# Patient Record
Sex: MALE | Race: BLACK OR AFRICAN AMERICAN | URBAN - METROPOLITAN AREA
[De-identification: names, ages, dates, MRNs, and addresses within clinical notes are randomized per-mention and may not be internally consistent; named-entity substitution may affect disease eponyms.]

---

## 2020-01-16 ENCOUNTER — ED HISTORICAL/CONVERTED ENCOUNTER (OUTPATIENT)
Dept: OTHER | Age: 25
End: 2020-01-16

## 2020-04-21 ENCOUNTER — ED HISTORICAL/CONVERTED ENCOUNTER (OUTPATIENT)
Dept: OTHER | Age: 25
End: 2020-04-21

## 2023-04-15 ENCOUNTER — HOSPITAL ENCOUNTER (EMERGENCY)
Age: 28
Discharge: HOME | End: 2023-04-15
Payer: MEDICAID

## 2023-04-15 ENCOUNTER — APPOINTMENT (OUTPATIENT)
Dept: ULTRASOUND IMAGING | Age: 28
End: 2023-04-15
Attending: NURSE PRACTITIONER
Payer: MEDICAID

## 2023-04-15 VITALS
TEMPERATURE: 99.5 F | OXYGEN SATURATION: 97 % | DIASTOLIC BLOOD PRESSURE: 72 MMHG | RESPIRATION RATE: 18 BRPM | WEIGHT: 130 LBS | HEART RATE: 92 BPM | HEIGHT: 71 IN | SYSTOLIC BLOOD PRESSURE: 118 MMHG | BODY MASS INDEX: 18.2 KG/M2

## 2023-04-15 DIAGNOSIS — N45.3 EPIDIDYMO-ORCHITIS, ACUTE: Primary | ICD-10-CM

## 2023-04-15 LAB
APPEARANCE UR: CLEAR
BACTERIA URNS QL MICRO: NEGATIVE /HPF
BILIRUB UR QL: NEGATIVE
COLOR UR: ABNORMAL
EPITH CASTS URNS QL MICRO: ABNORMAL /LPF
GLUCOSE UR STRIP.AUTO-MCNC: NEGATIVE MG/DL
HGB UR QL STRIP: ABNORMAL
KETONES UR QL STRIP.AUTO: NEGATIVE MG/DL
LEUKOCYTE ESTERASE UR QL STRIP.AUTO: ABNORMAL
MUCOUS THREADS URNS QL MICRO: ABNORMAL /LPF
NITRITE UR QL STRIP.AUTO: NEGATIVE
PH UR STRIP: 6 (ref 5–8)
PROT UR STRIP-MCNC: NEGATIVE MG/DL
RBC #/AREA URNS HPF: ABNORMAL /HPF (ref 0–5)
SP GR UR REFRACTOMETRY: 1.02 (ref 1–1.03)
UA: UC IF INDICATED,UAUC: ABNORMAL
UROBILINOGEN UR QL STRIP.AUTO: 4 EU/DL (ref 0.1–1)
WBC URNS QL MICRO: ABNORMAL /HPF (ref 0–4)

## 2023-04-15 PROCEDURE — 87086 URINE CULTURE/COLONY COUNT: CPT

## 2023-04-15 PROCEDURE — 87591 N.GONORRHOEAE DNA AMP PROB: CPT

## 2023-04-15 PROCEDURE — 99284 EMERGENCY DEPT VISIT MOD MDM: CPT

## 2023-04-15 PROCEDURE — 74011000250 HC RX REV CODE- 250: Performed by: NURSE PRACTITIONER

## 2023-04-15 PROCEDURE — 74011250637 HC RX REV CODE- 250/637: Performed by: NURSE PRACTITIONER

## 2023-04-15 PROCEDURE — 74011250636 HC RX REV CODE- 250/636: Performed by: NURSE PRACTITIONER

## 2023-04-15 PROCEDURE — 76870 US EXAM SCROTUM: CPT

## 2023-04-15 PROCEDURE — 96372 THER/PROPH/DIAG INJ SC/IM: CPT

## 2023-04-15 PROCEDURE — 87491 CHLMYD TRACH DNA AMP PROBE: CPT

## 2023-04-15 PROCEDURE — 81001 URINALYSIS AUTO W/SCOPE: CPT

## 2023-04-15 RX ORDER — DOXYCYCLINE HYCLATE 100 MG
100 TABLET ORAL 2 TIMES DAILY
Qty: 20 TABLET | Refills: 0 | Status: SHIPPED | OUTPATIENT
Start: 2023-04-15 | End: 2023-04-25

## 2023-04-15 RX ORDER — IBUPROFEN 600 MG/1
600 TABLET ORAL
Status: COMPLETED | OUTPATIENT
Start: 2023-04-15 | End: 2023-04-15

## 2023-04-15 RX ORDER — IBUPROFEN 600 MG/1
600 TABLET ORAL
Qty: 20 TABLET | Refills: 0 | Status: SHIPPED | OUTPATIENT
Start: 2023-04-15

## 2023-04-15 RX ADMIN — IBUPROFEN 600 MG: 600 TABLET, FILM COATED ORAL at 22:42

## 2023-04-15 RX ADMIN — LIDOCAINE HYDROCHLORIDE 500 MG: 10 INJECTION, SOLUTION INFILTRATION; PERINEURAL at 22:42

## 2023-04-17 LAB
BACTERIA SPEC CULT: NORMAL
C TRACH DNA SPEC QL NAA+PROBE: POSITIVE
N GONORRHOEA DNA SPEC QL NAA+PROBE: NEGATIVE
SAMPLE TYPE: ABNORMAL
SERVICE CMNT-IMP: ABNORMAL
SPECIAL REQUESTS,SREQ: NORMAL
SPECIMEN SOURCE: ABNORMAL

## 2023-04-18 NOTE — ED PROVIDER NOTES
[facility] EMERGENCY DEPT  EMERGENCY DEPARTMENT HISTORY AND PHYSICAL EXAM      Date: 4/15/2023  Patient Name: Vimal Barth  MRN: [id number]  YOB: 1995  Date of evaluation: 4/15/2023  Provider: Mya Celis NP   Note Started: 2:44 AM 4/18/23    HISTORY OF PRESENT ILLNESS     Chief Complaint   Patient presents with    Groin Pain       History Provided By: Patient    HPI: Vimal Barth is a 34 y.o. male with no significant past medical history presents to the emergency room with cc of testicular pain. Patient reports 3-day history of intermittent right testicular pain and swelling. He states the pain began after having rough sexual intercourse. He denies any fevers, abdominal pain, flank pain, dysuria, hematuria or penile discharge. He adamantly denies any STI concerns. PAST MEDICAL HISTORY   Past Medical History:  Past Medical History:   Diagnosis Date    Patient denies medical problems        Past Surgical History:  History reviewed. No pertinent surgical history. Family History:  History reviewed. No pertinent family history. Social History: Allergies:  No Known Allergies    PCP: None    Current Meds:   Discharge Medication List as of 4/15/2023 10:43 PM          PHYSICAL EXAM     ED Triage Vitals   ED Encounter Vitals Group      BP 04/15/23 1908 118/72      Pulse (Heart Rate) 04/15/23 1908 92      Resp Rate 04/15/23 1908 18      Temp 04/15/23 1908 99.5 °F (37.5 °C)      Temp src --       O2 Sat (%) 04/15/23 1908 97 %      Weight 04/15/23 1909 130 lb      Height 04/15/23 1909 5' 11\"      Physical Exam  Vitals and nursing note reviewed. Constitutional:       General: He is not in acute distress. Appearance: Normal appearance. He is not toxic-appearing. HENT:      Head: Normocephalic and atraumatic. Eyes:      Extraocular Movements: Extraocular movements intact.       Conjunctiva/sclera: Conjunctivae normal.   Cardiovascular:      Rate and Rhythm: Normal rate and regular rhythm. Heart sounds: Normal heart sounds. Pulmonary:      Effort: Pulmonary effort is normal.      Breath sounds: Normal breath sounds. No wheezing or rales. Abdominal:      General: Bowel sounds are normal.      Palpations: Abdomen is soft. Tenderness: There is no abdominal tenderness. There is no right CVA tenderness, left CVA tenderness, guarding or rebound. Hernia: No hernia is present. Genitourinary:     Testes:         Right: Tenderness and swelling present. Left: Tenderness or swelling not present. Epididymis:      Right: Enlarged. Tenderness present. Musculoskeletal:         General: Normal range of motion. Cervical back: Normal range of motion and neck supple. Skin:     General: Skin is warm and dry. Neurological:      General: No focal deficit present. Mental Status: He is alert. Psychiatric:         Mood and Affect: Mood normal.         Behavior: Behavior normal. Behavior is cooperative.      LAB, EKG AND DIAGNOSTIC RESULTS   Labs:  Admission on 04/15/2023, Discharged on 04/15/2023   Component Date Value    Color 04/15/2023 Yellow/Straw     Appearance 04/15/2023 Clear     Specific gravity 04/15/2023 1.016     pH (UA) 04/15/2023 6.0     Protein 04/15/2023 Negative     Glucose 04/15/2023 Negative     Ketone 04/15/2023 Negative     Bilirubin 04/15/2023 Negative     Blood 04/15/2023 Small (A)     Urobilinogen 04/15/2023 4.0 (H)     Nitrites 04/15/2023 Negative     Leukocyte Esterase 04/15/2023 Moderate (A)     WBC 04/15/2023      RBC 04/15/2023 5-10     Epithelial cells 04/15/2023 Few     Bacteria 04/15/2023 Negative     UA:UC IF INDICATED 04/15/2023 Urine Culture Ordered (A)     Mucus 04/15/2023 Trace (A)     Sample type 04/15/2023 Urine     Source 04/15/2023 Urine     Chlamydia amplified,urine 04/15/2023 Positive (A)     N. gonorrhoeae amplified* 04/15/2023 Negative     Comment 04/15/2023 Testing performed by the [employer] Wendy CT/NG method, utilizing PCR     Special Requests: 04/15/2023                      Value:No Special Requests  Reflexed from [id number]      Culture result: 04/15/2023 No Growth (<1000 cfu/mL)          EKG: Initial EKG interpreted by me. Not Applicable    Radiologic Studies:  Non-plain film images such as CT, Ultrasound and MRI are read by the radiologist. Plain radiographic images are visualized and preliminarily interpreted by the ED Physician with the following findings: Not Applicable    Interpretation per the Radiologist below, if available at the time of this note:  No results found. PROCEDURES   Unless otherwise noted below, none. Procedures      CRITICAL CARE TIME   Patient does not meet Critical Care Time, 0 minutes    ED COURSE and DIFFERENTIAL DIAGNOSIS/MDM   CC/HPI/PE Summary, DDx: Patient presents with testicular pain and swelling. Differential includes testicle torsion, orchitis, epididymitis, hydrocele, variocele, hernia. Will get UA, urine GC, and scrotum US as well as treat with analgesics. Records Reviewed (source and summary of external notes): Prior medical records and Nursing notes    Vitals:    Vitals:    04/15/23 1908 04/15/23 1909   BP: 118/72    Pulse: 92    Resp: 18    Temp: 99.5 °F (37.5 °C)    SpO2: 97%    Weight:  59 kg (130 lb)   Height:  5' 11\" (1.803 m)        ED COURSE  ED Course as of 04/18/23 0244   Sat Apr 15, 2023   2159 Testicular US findings consistent with right epididymitis. Small right hydrocele. Patient updated on results and plan of care. Recommend treatment for GC/chlamydia though results are pending. Patient is agreeable to pain of care. Will treat with Rocephin IM prior to discharge along with Rx doxycycline. Reinforced importance of antibiotics completion. Recommend sexual abstinence pending results and treatment of partners. Red flags and return precautions discussed. PCP/urology follow-up.   Patient stable condition at time of discharge [LP]      ED Course User Index  [LP] Marilin Timmons NP       Disposition Considerations (Tests not done, Shared Decision Making, Pt Expectation of Test or Treatment.): Not Applicable    Patient was given the following medications:  Medications   cefTRIAXone (ROCEPHIN) 500 mg in lidocaine (XYLOCAINE) 10 mg/mL (1 %) IM syringe (500 mg IntraMUSCular Given 4/15/23 2242)   ibuprofen (MOTRIN) tablet 600 mg (600 mg Oral Given 4/15/23 2242)       CONSULTS: (Who and What was discussed)  None     Social Determinants affecting Dx or Tx: None    Smoking Cessation: Not Applicable    FINAL IMPRESSION     1. Epididymo-orchitis, acute          DISPOSITION/PLAN   Discharged    Discharge Note: The patient is stable for discharge home. The signs, symptoms, diagnosis, and discharge instructions have been discussed, understanding conveyed, and agreed upon. The patient is to follow up as recommended or return to ER should their symptoms worsen. PATIENT REFERRED TO:  Follow-up Information       Follow up With Specialties Details Why [address] [place] [address] [id number]  Schedule an appointment as soon as possible for a visit  OR your PCP, for ER follow up [address]    Kath Smalls MD Urology Schedule an appointment as soon as possible for a visit  urology, If symptoms worsen [address] [place] [phone]  [phone]                DISCHARGE MEDICATIONS:  Discharge Medication List as of 4/15/2023 10:43 PM            DISCONTINUED MEDICATIONS:  Discharge Medication List as of 4/15/2023 10:43 PM          I am the Primary Clinician of Record: Alfred Grijalva NP (electronically signed)    (Please note that parts of this dictation were completed with voice recognition software. Quite often unanticipated grammatical, syntax, homophones, and other interpretive errors are inadvertently transcribed by the computer software. Please disregards these errors.  Please excuse any errors that have escaped final proofreading.)

## 2025-05-09 ENCOUNTER — HOSPITAL ENCOUNTER (EMERGENCY)
Facility: HOSPITAL | Age: 30
Discharge: HOME | End: 2025-05-09

## 2025-05-09 VITALS
WEIGHT: 130 LBS | RESPIRATION RATE: 16 BRPM | DIASTOLIC BLOOD PRESSURE: 78 MMHG | HEIGHT: 71 IN | OXYGEN SATURATION: 99 % | HEART RATE: 63 BPM | TEMPERATURE: 98.1 F | BODY MASS INDEX: 18.2 KG/M2 | SYSTOLIC BLOOD PRESSURE: 141 MMHG

## 2025-05-09 DIAGNOSIS — L03.119 CELLULITIS AND ABSCESS OF LEG: Primary | ICD-10-CM

## 2025-05-09 DIAGNOSIS — L02.419 CELLULITIS AND ABSCESS OF LEG: Primary | ICD-10-CM

## 2025-05-09 PROCEDURE — 99283 EMERGENCY DEPT VISIT LOW MDM: CPT

## 2025-05-09 RX ORDER — IBUPROFEN 600 MG/1
600 TABLET, FILM COATED ORAL 3 TIMES DAILY PRN
Qty: 30 TABLET | Refills: 0 | Status: SHIPPED | OUTPATIENT
Start: 2025-05-09

## 2025-05-09 RX ORDER — DOXYCYCLINE HYCLATE 100 MG
100 TABLET ORAL 2 TIMES DAILY
Qty: 20 TABLET | Refills: 0 | Status: SHIPPED | OUTPATIENT
Start: 2025-05-09 | End: 2025-05-19

## 2025-05-09 ASSESSMENT — PAIN SCALES - GENERAL: PAINLEVEL_OUTOF10: 3

## 2025-05-09 ASSESSMENT — PAIN DESCRIPTION - LOCATION: LOCATION: LEG

## 2025-05-09 ASSESSMENT — PAIN DESCRIPTION - ORIENTATION: ORIENTATION: RIGHT

## 2025-05-09 ASSESSMENT — PAIN - FUNCTIONAL ASSESSMENT: PAIN_FUNCTIONAL_ASSESSMENT: 0-10

## 2025-05-09 NOTE — ED TRIAGE NOTES
Pt states he noticed an area of redness on his right thigh last night at 5pm, believes it is a spider bite

## 2025-05-09 NOTE — ED PROVIDER NOTES
[facility] EMERGENCY DEPT  EMERGENCY DEPARTMENT HISTORY AND PHYSICAL EXAM      Date of evaluation: 5/9/2025  Patient Name: Giorgio Flanagan  Birthdate 1995  MRN: [id number]  ED Provider: MICAH Sanchez NP   Note Started: 12:10 PM EDT 5/9/25    HISTORY OF PRESENT ILLNESS     Chief Complaint   Patient presents with    Insect Bite       History Provided By: Patient, only     HPI: Giorgio Flanagan is a 30 y.o. male past medical history reviewed as listed below presents for concern for abscess or insect bite to right lateral right upper leg  Duration: noticed this morning upon awakening  Drainage: none  Surrounding erythema: mild but no streaking  Prior episodes: yes on buttocks several years ago  Prior I&D: yes  Current antibiotics: none    Patient denies fevers, chills, lightheadedness, chest pain, abdominal pain, nausea, vomiting, or any other symptoms.      PAST MEDICAL HISTORY   Past Medical History:  Past Medical History:   Diagnosis Date    Patient denies medical problems        Past Surgical History:  History reviewed. No pertinent surgical history.    Family History:  History reviewed. No pertinent family history.    Social History:  Social History     Tobacco Use    Smoking status: Some Days     Types: Cigarettes       Allergies:  No Known Allergies    PCP: No, Pcp    Current Meds:   No current facility-administered medications for this encounter.     Current Outpatient Medications   Medication Sig Dispense Refill    doxycycline hyclate (VIBRA-TABS) 100 MG tablet Take 1 tablet by mouth 2 times daily for 10 days 20 tablet 0    ibuprofen (ADVIL;MOTRIN) 600 MG tablet Take 1 tablet by mouth 3 times daily as needed for Pain 30 tablet 0       Social Determinants of Health:   Social Drivers of Health     Tobacco Use: High Risk (5/9/2025)    Patient History     Smoking Tobacco Use: Some Days     Smokeless Tobacco Use: Unknown     Passive Exposure: Not on file   Alcohol Use: Not on file   Financial Resource